# Patient Record
Sex: MALE | Race: BLACK OR AFRICAN AMERICAN | NOT HISPANIC OR LATINO | URBAN - METROPOLITAN AREA
[De-identification: names, ages, dates, MRNs, and addresses within clinical notes are randomized per-mention and may not be internally consistent; named-entity substitution may affect disease eponyms.]

---

## 2022-08-19 ENCOUNTER — CLAIMS CREATED FROM THE CLAIM WINDOW (OUTPATIENT)
Dept: URBAN - METROPOLITAN AREA CLINIC 113 | Facility: CLINIC | Age: 22
End: 2022-08-19
Payer: COMMERCIAL

## 2022-08-19 ENCOUNTER — WEB ENCOUNTER (OUTPATIENT)
Dept: URBAN - METROPOLITAN AREA CLINIC 113 | Facility: CLINIC | Age: 22
End: 2022-08-19

## 2022-08-19 VITALS
DIASTOLIC BLOOD PRESSURE: 80 MMHG | WEIGHT: 124 LBS | HEIGHT: 71 IN | BODY MASS INDEX: 17.36 KG/M2 | RESPIRATION RATE: 20 BRPM | HEART RATE: 75 BPM | SYSTOLIC BLOOD PRESSURE: 117 MMHG | TEMPERATURE: 97.3 F

## 2022-08-19 DIAGNOSIS — R10.84 GENERALIZED ABDOMINAL PAIN: ICD-10-CM

## 2022-08-19 DIAGNOSIS — K50.80 CROHN'S DISEASE OF BOTH SMALL AND LARGE INTESTINE: ICD-10-CM

## 2022-08-19 PROCEDURE — 99204 OFFICE O/P NEW MOD 45 MIN: CPT | Performed by: INTERNAL MEDICINE

## 2022-08-19 PROCEDURE — 99204 OFFICE O/P NEW MOD 45 MIN: CPT | Performed by: NURSE PRACTITIONER

## 2022-08-19 NOTE — HPI-TODAY'S VISIT:
21-year-old male with a history of ileocolonic Crohn's disease diagnosed in October 2020 by Dr. Vásquez, presenting for evaluation of multiple abdominal complaints and to establish care. This is a same day appointment. He was diagnosed with Crohn's disease and gastric ulcers related to (treated) H. pylori on EGD and colonoscopy performed in October 2020, following presentation for abdominal pain and weight loss.  He was initially treated with sulfasalazine and then transitioned to Humira, which he stopped on his own accord after 6 months of therapy (due to reported lack of response). He has been off Crohn's treatment for at least the last 5 months.  Per a review of [facility] records, he has not been seen by pediatric GI since January 2021. He complains of daily exacerbations of mid abdominal pain, described as sharp and cramping.  The discomfort is worse following meals and only temporarily relieved with a bowel movement.  He has a single bowel movement each morning, occasionally followed by frequent and urgent stools later in the day.  He experiences nocturnal defecation at least once a week.  No blood per rectum.  He states that Humira did improve his abdominal cramping but did not change the excess gas, bloating, or diarrhea that he was experiencing.  He presents today to establish adult GI care and discuss treatment options. EGD and colonoscopy 10/9/2020 by Dr. Vásquez revealed several ulcers in the gastric antrum and a single ulcer in the duodenal bulb.  Patches of ulceration were noted throughout the colon, mostly on the distal half to 60cm.  The terminal ileum was markedly inflamed.  Gastric biopsy showed reactive gastropathy, negative for H. pylori which had already been treated.  TI biopsy showed severe active ileitis.  Additional colon biopsies showed normal colonic mucosa, negative for pathologic change. CT enterography with contrast 10/26/2020:Wall thickening, mucosal hyperemia, and mesenteric engorgement of the distal ileum consistent with known diagnosis Crohn's disease.  There is a small amount of free fluid within the pelvis.

## 2022-08-22 PROBLEM — 71833008 CROHN'S DISEASE OF SMALL AND LARGE INTESTINES: Status: ACTIVE | Noted: 2022-08-19

## 2022-08-22 LAB
A/G RATIO: 1.4
ABSOLUTE BASOPHILS: 8
ABSOLUTE EOSINOPHILS: 90
ABSOLUTE LYMPHOCYTES: 1361
ABSOLUTE MONOCYTES: 795
ABSOLUTE NEUTROPHILS: 5945
ALBUMIN: 4.2
ALKALINE PHOSPHATASE: 67
ALT (SGPT): 14
AST (SGOT): 12
BASOPHILS: 0.1
BILIRUBIN, TOTAL: 0.2
BUN/CREATININE RATIO: (no result)
BUN: 18
C-REACTIVE PROTEIN, QUANT: 36.4
CALCIUM: 9.5
CARBON DIOXIDE, TOTAL: 28
CHLORIDE: 104
CREATININE: 1.2
EGFR: 89
EOSINOPHILS: 1.1
GLOBULIN, TOTAL: 2.9
GLUCOSE: 74
HEMATOCRIT: 39.7
HEMOGLOBIN: 13.7
LYMPHOCYTES: 16.6
MCH: 28.4
MCHC: 34.5
MCV: 82.2
MONOCYTES: 9.7
MPV: 9.5
NEUTROPHILS: 72.5
PLATELET COUNT: 321
POTASSIUM: 4.3
PROTEIN, TOTAL: 7.1
RDW: 13.6
RED BLOOD CELL COUNT: 4.83
SODIUM: 139
WHITE BLOOD CELL COUNT: 8.2

## 2022-08-23 ENCOUNTER — TELEPHONE ENCOUNTER (OUTPATIENT)
Dept: URBAN - METROPOLITAN AREA CLINIC 113 | Facility: CLINIC | Age: 22
End: 2022-08-23

## 2022-08-23 RX ORDER — DICYCLOMINE HYDROCHLORIDE 10 MG/1
1 CAPSULE CAPSULE ORAL
Qty: 60 | Refills: 1 | OUTPATIENT
Start: 2022-08-24 | End: 2022-10-23

## 2022-08-30 ENCOUNTER — TELEPHONE ENCOUNTER (OUTPATIENT)
Dept: URBAN - METROPOLITAN AREA CLINIC 113 | Facility: CLINIC | Age: 22
End: 2022-08-30

## 2022-09-06 ENCOUNTER — OFFICE VISIT (OUTPATIENT)
Dept: URBAN - METROPOLITAN AREA SURGERY CENTER 25 | Facility: SURGERY CENTER | Age: 22
End: 2022-09-06
Payer: COMMERCIAL

## 2022-09-06 ENCOUNTER — CLAIMS CREATED FROM THE CLAIM WINDOW (OUTPATIENT)
Dept: URBAN - METROPOLITAN AREA CLINIC 4 | Facility: CLINIC | Age: 22
End: 2022-09-06
Payer: COMMERCIAL

## 2022-09-06 ENCOUNTER — TELEPHONE ENCOUNTER (OUTPATIENT)
Dept: URBAN - METROPOLITAN AREA CLINIC 113 | Facility: CLINIC | Age: 22
End: 2022-09-06

## 2022-09-06 DIAGNOSIS — K52.89 OTHER SPECIFIED NONINFECTIVE GASTROENTERITIS AND COLITIS: ICD-10-CM

## 2022-09-06 DIAGNOSIS — K50.00 CROHN'S DISEASE OF ILEUM WITHOUT COMPLICATION: ICD-10-CM

## 2022-09-06 DIAGNOSIS — K63.89 OTHER SPECIFIED DISEASES OF INTESTINE: ICD-10-CM

## 2022-09-06 PROCEDURE — 45380 COLONOSCOPY AND BIOPSY: CPT | Performed by: INTERNAL MEDICINE

## 2022-09-06 PROCEDURE — 88305 TISSUE EXAM BY PATHOLOGIST: CPT | Performed by: PATHOLOGY

## 2022-09-06 PROCEDURE — G8907 PT DOC NO EVENTS ON DISCHARG: HCPCS | Performed by: INTERNAL MEDICINE

## 2022-09-06 RX ORDER — DICYCLOMINE HYDROCHLORIDE 10 MG/1
1 CAPSULE CAPSULE ORAL
Qty: 60 | Refills: 1 | Status: ACTIVE | COMMUNITY
Start: 2022-08-24 | End: 2022-10-23

## 2022-09-06 RX ORDER — USTEKINUMAB 130 MG/26ML
AS DIRECTED SOLUTION INTRAVENOUS ONCE
Qty: 390 MILLIGRAMS | Refills: 0 | OUTPATIENT
Start: 2022-09-21 | End: 2022-09-22

## 2022-09-11 ENCOUNTER — CLAIMS CREATED FROM THE CLAIM WINDOW (OUTPATIENT)
Dept: URBAN - METROPOLITAN AREA MEDICAL CENTER 19 | Facility: MEDICAL CENTER | Age: 22
End: 2022-09-11
Payer: COMMERCIAL

## 2022-09-11 DIAGNOSIS — R10.814 LLQ ABDOMINAL TENDERNESS: ICD-10-CM

## 2022-09-11 DIAGNOSIS — R19.7 ACUTE DIARRHEA: ICD-10-CM

## 2022-09-11 DIAGNOSIS — R06.02 SOB (SHORTNESS OF BREATH): ICD-10-CM

## 2022-09-11 DIAGNOSIS — M79.10 MUSCLE ACHE: ICD-10-CM

## 2022-09-11 DIAGNOSIS — R63.4 ABNORMAL WEIGHT LOSS: ICD-10-CM

## 2022-09-11 DIAGNOSIS — K50.818 CROHN'S DISEASE OF BOTH SMALL AND LARGE INTESTINE WITH OTHER COMPLICATION: ICD-10-CM

## 2022-09-11 PROCEDURE — 99222 1ST HOSP IP/OBS MODERATE 55: CPT | Performed by: INTERNAL MEDICINE

## 2022-09-11 PROCEDURE — 99254 IP/OBS CNSLTJ NEW/EST MOD 60: CPT | Performed by: INTERNAL MEDICINE

## 2022-09-12 ENCOUNTER — CLAIMS CREATED FROM THE CLAIM WINDOW (OUTPATIENT)
Dept: URBAN - METROPOLITAN AREA MEDICAL CENTER 19 | Facility: MEDICAL CENTER | Age: 22
End: 2022-09-12
Payer: COMMERCIAL

## 2022-09-12 ENCOUNTER — TELEPHONE ENCOUNTER (OUTPATIENT)
Dept: URBAN - METROPOLITAN AREA CLINIC 113 | Facility: CLINIC | Age: 22
End: 2022-09-12

## 2022-09-12 DIAGNOSIS — R10.84 ABDOMINAL PAIN, DIFFUSE: ICD-10-CM

## 2022-09-12 DIAGNOSIS — K50.818 CROHN'S DISEASE OF BOTH SMALL AND LARGE INTESTINE WITH OTHER COMPLICATION: ICD-10-CM

## 2022-09-12 DIAGNOSIS — R30.0 DYSURIA: ICD-10-CM

## 2022-09-12 LAB
HEPATITIS B SURFACE AB IMMUNITY, QN: 351
HEPATITIS B SURFACE ANTIGEN: (no result)
MITOGEN-NIL: 6.46
NIL: 0.04
QUANTIFERON(R)-TB GOLD: NEGATIVE
TB1-NIL: 0.01
TB2-NIL: 0.01

## 2022-09-12 PROCEDURE — 99233 SBSQ HOSP IP/OBS HIGH 50: CPT | Performed by: INTERNAL MEDICINE

## 2022-09-12 PROCEDURE — 99232 SBSQ HOSP IP/OBS MODERATE 35: CPT | Performed by: INTERNAL MEDICINE

## 2022-09-22 ENCOUNTER — TELEPHONE ENCOUNTER (OUTPATIENT)
Dept: URBAN - METROPOLITAN AREA CLINIC 113 | Facility: CLINIC | Age: 22
End: 2022-09-22

## 2022-10-03 ENCOUNTER — TELEPHONE ENCOUNTER (OUTPATIENT)
Dept: URBAN - METROPOLITAN AREA CLINIC 113 | Facility: CLINIC | Age: 22
End: 2022-10-03

## 2022-10-04 ENCOUNTER — TELEPHONE ENCOUNTER (OUTPATIENT)
Dept: URBAN - METROPOLITAN AREA CLINIC 113 | Facility: CLINIC | Age: 22
End: 2022-10-04

## 2022-10-04 RX ORDER — PREDNISONE 10 MG/1
1 TABLET TABLET ORAL ONCE A DAY
Qty: 90 | Refills: 0 | OUTPATIENT
Start: 2022-10-04 | End: 2022-11-03

## 2022-10-26 ENCOUNTER — TELEPHONE ENCOUNTER (OUTPATIENT)
Dept: URBAN - METROPOLITAN AREA CLINIC 113 | Facility: CLINIC | Age: 22
End: 2022-10-26

## 2022-10-26 RX ORDER — USTEKINUMAB 130 MG/26ML
AS DIRECTED SOLUTION INTRAVENOUS ONCE
Qty: 390 MILLIGRAMS | Refills: 0
Start: 2022-09-21 | End: 2022-10-27

## 2022-10-31 ENCOUNTER — OFFICE VISIT (OUTPATIENT)
Dept: URBAN - METROPOLITAN AREA CLINIC 112 | Facility: CLINIC | Age: 22
End: 2022-10-31
Payer: COMMERCIAL

## 2022-10-31 VITALS
BODY MASS INDEX: 15.68 KG/M2 | HEART RATE: 79 BPM | WEIGHT: 112 LBS | RESPIRATION RATE: 20 BRPM | HEIGHT: 71 IN | SYSTOLIC BLOOD PRESSURE: 118 MMHG | TEMPERATURE: 97.2 F | DIASTOLIC BLOOD PRESSURE: 74 MMHG

## 2022-10-31 DIAGNOSIS — K50.90 CROHNS DISEASE: ICD-10-CM

## 2022-10-31 PROCEDURE — 87350 HEPATITIS BE AG IA: CPT | Performed by: INTERNAL MEDICINE

## 2022-10-31 PROCEDURE — 96413 CHEMO IV INFUSION 1 HR: CPT | Performed by: INTERNAL MEDICINE

## 2022-10-31 PROCEDURE — 82977 ASSAY OF GGT: CPT | Performed by: INTERNAL MEDICINE

## 2022-10-31 PROCEDURE — 86707 HEPATITIS BE ANTIBODY: CPT | Performed by: INTERNAL MEDICINE

## 2022-10-31 RX ORDER — PREDNISONE 10 MG/1
1 TABLET TABLET ORAL ONCE A DAY
Qty: 90 | Refills: 0 | Status: ACTIVE | COMMUNITY
Start: 2022-10-04 | End: 2022-11-03

## 2022-11-04 ENCOUNTER — OFFICE VISIT (OUTPATIENT)
Dept: URBAN - METROPOLITAN AREA CLINIC 107 | Facility: CLINIC | Age: 22
End: 2022-11-04
Payer: COMMERCIAL

## 2022-11-04 VITALS
HEART RATE: 77 BPM | TEMPERATURE: 98.3 F | SYSTOLIC BLOOD PRESSURE: 122 MMHG | RESPIRATION RATE: 18 BRPM | DIASTOLIC BLOOD PRESSURE: 84 MMHG | BODY MASS INDEX: 15.82 KG/M2 | WEIGHT: 113 LBS | HEIGHT: 71 IN

## 2022-11-04 DIAGNOSIS — Z79.899 HIGH RISK MEDICATION USE: ICD-10-CM

## 2022-11-04 DIAGNOSIS — K21.9 GERD WITHOUT ESOPHAGITIS: ICD-10-CM

## 2022-11-04 DIAGNOSIS — K50.00 CROHN'S DISEASE OF ILEUM WITHOUT COMPLICATION: ICD-10-CM

## 2022-11-04 DIAGNOSIS — R10.84 GENERALIZED ABDOMINAL PAIN: ICD-10-CM

## 2022-11-04 PROBLEM — 266435005: Status: ACTIVE | Noted: 2022-11-04

## 2022-11-04 PROBLEM — 38106008: Status: ACTIVE | Noted: 2022-09-06

## 2022-11-04 PROCEDURE — 99214 OFFICE O/P EST MOD 30 MIN: CPT | Performed by: INTERNAL MEDICINE

## 2022-11-04 RX ORDER — OMEPRAZOLE 40 MG/1
1 CAPSULE 30 MINUTES BEFORE MORNING MEAL CAPSULE, DELAYED RELEASE ORAL ONCE A DAY
Status: ACTIVE | COMMUNITY

## 2022-11-04 RX ORDER — OMEPRAZOLE 40 MG/1: 1 CAPSULE 30 MINUTES BEFORE MORNING MEAL CAPSULE, DELAYED RELEASE ORAL ONCE A DAY

## 2022-11-04 NOTE — HPI-TODAY'S VISIT:
Patient returns today in follow.  He got his first dose of Stelara last week.  His injection dosing has been sent.  He reports he feels better.  His pain is improved.  He is starting to eat more.  However his weight remains low.  He is not have any bleeding.  He is having better bowel movements at this point.  He was constipated previously.  He denies any fevers or chills.  No new complaints.  No cough or night sweats or fever.

## 2022-11-10 ENCOUNTER — TELEPHONE ENCOUNTER (OUTPATIENT)
Dept: URBAN - METROPOLITAN AREA CLINIC 23 | Facility: CLINIC | Age: 22
End: 2022-11-10

## 2023-02-06 ENCOUNTER — TELEPHONE ENCOUNTER (OUTPATIENT)
Dept: URBAN - METROPOLITAN AREA CLINIC 113 | Facility: CLINIC | Age: 23
End: 2023-02-06

## 2023-02-06 ENCOUNTER — OFFICE VISIT (OUTPATIENT)
Dept: URBAN - METROPOLITAN AREA CLINIC 107 | Facility: CLINIC | Age: 23
End: 2023-02-06

## 2023-02-06 RX ORDER — OMEPRAZOLE 40 MG/1
1 CAPSULE 30 MINUTES BEFORE MORNING MEAL CAPSULE, DELAYED RELEASE ORAL ONCE A DAY
Status: ACTIVE | COMMUNITY

## 2023-02-20 ENCOUNTER — WEB ENCOUNTER (OUTPATIENT)
Dept: URBAN - METROPOLITAN AREA CLINIC 107 | Facility: CLINIC | Age: 23
End: 2023-02-20

## 2023-02-20 ENCOUNTER — OFFICE VISIT (OUTPATIENT)
Dept: URBAN - METROPOLITAN AREA CLINIC 107 | Facility: CLINIC | Age: 23
End: 2023-02-20
Payer: COMMERCIAL

## 2023-02-20 VITALS
BODY MASS INDEX: 17.78 KG/M2 | WEIGHT: 127 LBS | SYSTOLIC BLOOD PRESSURE: 120 MMHG | DIASTOLIC BLOOD PRESSURE: 68 MMHG | TEMPERATURE: 98.2 F | HEIGHT: 71 IN | HEART RATE: 73 BPM | RESPIRATION RATE: 18 BRPM

## 2023-02-20 DIAGNOSIS — K50.00 CROHN'S DISEASE INVOLVING TERMINAL ILEUM: ICD-10-CM

## 2023-02-20 DIAGNOSIS — L81.9 HYPERPIGMENTED SKIN LESION: ICD-10-CM

## 2023-02-20 DIAGNOSIS — Z79.899 HIGH RISK MEDICATION USE: ICD-10-CM

## 2023-02-20 PROCEDURE — 99214 OFFICE O/P EST MOD 30 MIN: CPT | Performed by: NURSE PRACTITIONER

## 2023-02-20 RX ORDER — OMEPRAZOLE 40 MG/1
1 CAPSULE 30 MINUTES BEFORE MORNING MEAL CAPSULE, DELAYED RELEASE ORAL ONCE A DAY
Status: ACTIVE | COMMUNITY

## 2023-02-20 NOTE — HPI-TODAY'S VISIT:
23 yo male with a history of Crohn's disease, generalized abdominal pain, and GERD, presenting for follow up.  He was initially diagnosed with ileocolonic Crohn's disease in October 2020 by Dr. Vásquez. A colonoscopy on 9/6/22 for disease assessment was notable for a good prep, normal examined colon s/p random biopsies, and a diffuse area of congested/erythematous/ulcerated mucosa in the terminal ileum consistent with ileal Crohn's disease. Biopsies obtained. Pathology revealed chronic ileitis c/w Crohn's disease. Random colon biopsies were without significant abnormality. He was recommended Stelara secondary to loss of efficacy of TNF agents (previously on Humira). Stelara was induced on 10/31/22. He was to continue Stelara injections every 8 weeks, with plans to repeat inflammatory markers in 3 months, and MR enterography in 6 months.  Interval history, 2/20/23. He is doing well with Stelara every 8 weeks. He is not having symptoms related to his Crohn's disease. Since starting Stelara, he has developed an area of skin hyperpigmentation, about the size of a half dollar, on his left arm at the antecubital. This area is not itchy or inflamed. There is no joint pain or cough. No weight loss. No nausea or vomiting. No abdominal pain. He has bowel movements every 2 days without blood per rectum. No loose stools. There is no heartburn or dysphagia.

## 2023-02-20 NOTE — PHYSICAL EXAM SKIN:
no rashes , no jaundice present; area of skin hyperpigmentation about the size of a half dollar near the left antecubital

## 2023-03-28 ENCOUNTER — TELEPHONE ENCOUNTER (OUTPATIENT)
Dept: URBAN - METROPOLITAN AREA CLINIC 107 | Facility: CLINIC | Age: 23
End: 2023-03-28

## 2023-04-06 ENCOUNTER — OFFICE VISIT (OUTPATIENT)
Dept: URBAN - METROPOLITAN AREA CLINIC 113 | Facility: CLINIC | Age: 23
End: 2023-04-06
Payer: COMMERCIAL

## 2023-04-06 ENCOUNTER — DASHBOARD ENCOUNTERS (OUTPATIENT)
Age: 23
End: 2023-04-06

## 2023-04-06 VITALS
TEMPERATURE: 97.5 F | HEART RATE: 64 BPM | SYSTOLIC BLOOD PRESSURE: 113 MMHG | BODY MASS INDEX: 16.94 KG/M2 | RESPIRATION RATE: 18 BRPM | HEIGHT: 71 IN | DIASTOLIC BLOOD PRESSURE: 79 MMHG | WEIGHT: 121 LBS

## 2023-04-06 DIAGNOSIS — K50.012 CROHN'S DISEASE OF SMALL INTESTINE WITH INTESTINAL OBSTRUCTION: ICD-10-CM

## 2023-04-06 DIAGNOSIS — Z79.899 HIGH RISK MEDICATION USE: ICD-10-CM

## 2023-04-06 PROBLEM — 56689002: Status: ACTIVE | Noted: 2023-04-06

## 2023-04-06 PROCEDURE — 99214 OFFICE O/P EST MOD 30 MIN: CPT | Performed by: INTERNAL MEDICINE

## 2023-04-06 RX ORDER — OMEPRAZOLE 40 MG/1
1 CAPSULE 30 MINUTES BEFORE MORNING MEAL CAPSULE, DELAYED RELEASE ORAL ONCE A DAY
Status: ON HOLD | COMMUNITY

## 2023-04-06 NOTE — HPI-TODAY'S VISIT:
Patient returns today in follow-up.  Was recently hospitalized at [facility] for a bowel obstruction.  He tells me that he was doing great and he had the sudden onset of abdominal discomfort pain, eventually nausea and vomiting.  He is not passing flatus.  This happened out of the blue.  He had no prodromal symptoms.  He been doing very well with Stelara.  He has received an infusion as well as 3 maintenance injections and folic it was working quite well.  He did lose some weight right after admission but is eating better now.  Denies any blood in the stool.  He is having bowel movements regularly again.  Feels good otherwise.  He stopped antibiotics.  He is tapered off steroids.  Remains on Stelara and his last injection was a few days ago.  We have previously discussed an MRI enterography.  This is scheduled tentatively for April 14.

## 2023-05-16 ENCOUNTER — TELEPHONE ENCOUNTER (OUTPATIENT)
Dept: URBAN - METROPOLITAN AREA CLINIC 113 | Facility: CLINIC | Age: 23
End: 2023-05-16

## 2023-05-22 ENCOUNTER — OFFICE VISIT (OUTPATIENT)
Dept: URBAN - METROPOLITAN AREA CLINIC 107 | Facility: CLINIC | Age: 23
End: 2023-05-22

## 2023-05-26 ENCOUNTER — TELEPHONE ENCOUNTER (OUTPATIENT)
Dept: URBAN - METROPOLITAN AREA CLINIC 113 | Facility: CLINIC | Age: 23
End: 2023-05-26

## 2023-06-06 ENCOUNTER — TELEPHONE ENCOUNTER (OUTPATIENT)
Dept: URBAN - METROPOLITAN AREA CLINIC 113 | Facility: CLINIC | Age: 23
End: 2023-06-06

## 2024-05-15 ENCOUNTER — ERX REFILL RESPONSE (OUTPATIENT)
Dept: URBAN - METROPOLITAN AREA CLINIC 113 | Facility: CLINIC | Age: 24
End: 2024-05-15

## 2024-05-15 RX ORDER — USTEKINUMAB 90 MG/ML
INJECT 90 MG (1 ML) UNDER THE SKIN EVERY 8 WEEKS INJECTION, SOLUTION SUBCUTANEOUS
Qty: 1 MILLILITER | Refills: 1 | OUTPATIENT

## 2024-05-15 RX ORDER — USTEKINUMAB 90 MG/ML
INJECT 90 MG (1 ML) UNDER THE SKIN EVERY 8 WEEKS INJECTION, SOLUTION SUBCUTANEOUS
Qty: 1 MILLILITER | Refills: 0 | OUTPATIENT

## 2025-03-13 ENCOUNTER — TELEPHONE ENCOUNTER (OUTPATIENT)
Dept: URBAN - METROPOLITAN AREA CLINIC 113 | Facility: CLINIC | Age: 25
End: 2025-03-13

## 2025-04-03 ENCOUNTER — OFFICE VISIT (OUTPATIENT)
Dept: URBAN - METROPOLITAN AREA CLINIC 113 | Facility: CLINIC | Age: 25
End: 2025-04-03

## 2025-04-15 ENCOUNTER — TELEPHONE ENCOUNTER (OUTPATIENT)
Dept: URBAN - METROPOLITAN AREA CLINIC 113 | Facility: CLINIC | Age: 25
End: 2025-04-15

## 2025-04-30 ENCOUNTER — OFFICE VISIT (OUTPATIENT)
Dept: URBAN - METROPOLITAN AREA CLINIC 113 | Facility: CLINIC | Age: 25
End: 2025-04-30

## 2025-05-06 ENCOUNTER — OFFICE VISIT (OUTPATIENT)
Dept: URBAN - METROPOLITAN AREA CLINIC 113 | Facility: CLINIC | Age: 25
End: 2025-05-06

## 2025-05-06 RX ORDER — OMEPRAZOLE 40 MG/1
1 CAPSULE 30 MINUTES BEFORE MORNING MEAL CAPSULE, DELAYED RELEASE ORAL ONCE A DAY
Status: ON HOLD | COMMUNITY

## 2025-05-06 RX ORDER — USTEKINUMAB 90 MG/ML
INJECT 90 MG (1 ML) UNDER THE SKIN EVERY 8 WEEKS INJECTION, SOLUTION SUBCUTANEOUS
Qty: 1 MILLILITER | Refills: 1 | Status: ACTIVE | COMMUNITY

## 2025-05-07 ENCOUNTER — OFFICE VISIT (OUTPATIENT)
Dept: URBAN - METROPOLITAN AREA CLINIC 113 | Facility: CLINIC | Age: 25
End: 2025-05-07
Payer: COMMERCIAL

## 2025-05-07 ENCOUNTER — LAB OUTSIDE AN ENCOUNTER (OUTPATIENT)
Dept: URBAN - METROPOLITAN AREA CLINIC 113 | Facility: CLINIC | Age: 25
End: 2025-05-07

## 2025-05-07 DIAGNOSIS — Z79.899 HIGH RISK MEDICATION USE: ICD-10-CM

## 2025-05-07 DIAGNOSIS — R10.30 LOWER ABDOMINAL PAIN: ICD-10-CM

## 2025-05-07 DIAGNOSIS — K50.00 CROHN'S DISEASE INVOLVING TERMINAL ILEUM: ICD-10-CM

## 2025-05-07 PROCEDURE — 99214 OFFICE O/P EST MOD 30 MIN: CPT | Performed by: NURSE PRACTITIONER

## 2025-05-07 RX ORDER — USTEKINUMAB 90 MG/ML
PRE-FILLED SYRINGE INJECTION, SOLUTION SUBCUTANEOUS
OUTPATIENT
Start: 2025-05-07 | End: 2025-05-07

## 2025-05-07 RX ORDER — OMEPRAZOLE 40 MG/1
1 CAPSULE 30 MINUTES BEFORE MORNING MEAL CAPSULE, DELAYED RELEASE ORAL ONCE A DAY
Status: DISCONTINUED | COMMUNITY

## 2025-05-07 RX ORDER — USTEKINUMAB 90 MG/ML
INJECT 90 MG (1 ML) UNDER THE SKIN EVERY 8 WEEKS INJECTION, SOLUTION SUBCUTANEOUS
Qty: 1 MILLILITER | Refills: 1 | Status: ACTIVE | COMMUNITY

## 2025-05-07 NOTE — HPI-TODAY'S VISIT:
23 yo male with a history of Crohn's disease, previously on Stelara (exhausted supply/ lost to follow up) and Humira (loss of effectiveness), presenting for long interval follow up.  He was last seen in the office in April 2023 following a recent small bowel obstruction, likely related to adhesions. He was recommended a MRI enterography and asked to continue Stelara. MR enterography was not ever completed. He has been lost to follow up.    He contacted the office 4/15/25 with complaints of RLQ abdominal pain. He was recommended an office appointment as he has not been seen in the office in 2 years.  He has been experiencing sharp, stabbing and aching abdominal pain in the lower abdomen for the last 3 to 4 weeks. His appetite is poor. He tells me that he is losing weight. He was weighing up to 145 pounds earlier this year, but has dropped to 138 pounds today. This has been unintentional. His weight is up significantly from 2 years ago. There is no blood per rectum or melena. He has bowel movements once daily, normal formed stool. There is no nausea or vomiting. There is no heartburn or trouble with swallowing.  He is not currently on any medication for his Crohn's disease as he exhausted his supply. His last injection was around November 2024.

## 2025-05-12 LAB
A/G RATIO: 1.8
ABSOLUTE BASOPHILS: 9
ABSOLUTE EOSINOPHILS: 26
ABSOLUTE LYMPHOCYTES: 1375
ABSOLUTE MONOCYTES: 409
ABSOLUTE NEUTROPHILS: 6882
ALBUMIN: 4.7
ALKALINE PHOSPHATASE: 67
ALT (SGPT): 8
AST (SGOT): 11
BASOPHILS: 0.1
BILIRUBIN, TOTAL: 0.4
BUN/CREATININE RATIO: (no result)
BUN: 13
C-REACTIVE PROTEIN, QUANT: <3
CALCIUM: 9.6
CARBON DIOXIDE, TOTAL: 24
CHLORIDE: 103
CREATININE: 1.05
EGFR: 102
EOSINOPHILS: 0.3
GLOBULIN, TOTAL: 2.6
GLUCOSE: 130
HEMATOCRIT: 46.9
HEMOGLOBIN: 15.5
LYMPHOCYTES: 15.8
MCH: 29.2
MCHC: 33
MCV: 88.3
MITOGEN-NIL: >10
MONOCYTES: 4.7
MPV: 10.5
NEUTROPHILS: 79.1
PLATELET COUNT: 328
POTASSIUM: 3.7
PROTEIN, TOTAL: 7.3
QUANTIFERON NIL VALUE: 0.02
QUANTIFERON TB1 AG VALUE: 0
QUANTIFERON TB2 AG VALUE: 0
QUANTIFERON-TB GOLD PLUS: NEGATIVE
RDW: 13.1
RED BLOOD CELL COUNT: 5.31
SODIUM: 140
WHITE BLOOD CELL COUNT: 8.7

## 2025-07-18 ENCOUNTER — TELEPHONE ENCOUNTER (OUTPATIENT)
Dept: URBAN - METROPOLITAN AREA CLINIC 113 | Facility: CLINIC | Age: 25
End: 2025-07-18

## 2025-07-18 ENCOUNTER — OFFICE VISIT (OUTPATIENT)
Dept: URBAN - METROPOLITAN AREA CLINIC 112 | Facility: CLINIC | Age: 25
End: 2025-07-18
Payer: COMMERCIAL

## 2025-07-18 DIAGNOSIS — K50.00 CROHN'S DISEASE INVOLVING TERMINAL ILEUM: ICD-10-CM

## 2025-07-18 PROCEDURE — 96415 CHEMO IV INFUSION ADDL HR: CPT | Performed by: INTERNAL MEDICINE

## 2025-07-18 PROCEDURE — 96413 CHEMO IV INFUSION 1 HR: CPT | Performed by: INTERNAL MEDICINE

## 2025-07-18 RX ORDER — USTEKINUMAB 90 MG/ML
INJECT 90 MG (1 ML) UNDER THE SKIN EVERY 8 WEEKS INJECTION, SOLUTION SUBCUTANEOUS
Qty: 1 MILLILITER | Refills: 1 | Status: ACTIVE | COMMUNITY

## 2025-08-07 ENCOUNTER — OFFICE VISIT (OUTPATIENT)
Dept: URBAN - METROPOLITAN AREA CLINIC 113 | Facility: CLINIC | Age: 25
End: 2025-08-07

## 2025-08-07 RX ORDER — USTEKINUMAB 90 MG/ML
INJECT 90 MG (1 ML) UNDER THE SKIN EVERY 8 WEEKS INJECTION, SOLUTION SUBCUTANEOUS
Qty: 1 MILLILITER | Refills: 1 | Status: ACTIVE | COMMUNITY